# Patient Record
Sex: FEMALE | Race: AMERICAN INDIAN OR ALASKA NATIVE | HISPANIC OR LATINO | Employment: FULL TIME | URBAN - METROPOLITAN AREA
[De-identification: names, ages, dates, MRNs, and addresses within clinical notes are randomized per-mention and may not be internally consistent; named-entity substitution may affect disease eponyms.]

---

## 2022-05-24 ENCOUNTER — HOSPITAL ENCOUNTER (EMERGENCY)
Facility: HOSPITAL | Age: 22
Discharge: HOME | End: 2022-05-24
Attending: EMERGENCY MEDICINE | Admitting: EMERGENCY MEDICINE

## 2022-05-24 ENCOUNTER — APPOINTMENT (OUTPATIENT)
Dept: RADIOLOGY | Facility: HOSPITAL | Age: 22
End: 2022-05-24
Attending: EMERGENCY MEDICINE

## 2022-05-24 VITALS
RESPIRATION RATE: 19 BRPM | HEART RATE: 92 BPM | WEIGHT: 293 LBS | HEIGHT: 64 IN | SYSTOLIC BLOOD PRESSURE: 135 MMHG | BODY MASS INDEX: 50.02 KG/M2 | TEMPERATURE: 97.8 F | DIASTOLIC BLOOD PRESSURE: 70 MMHG | OXYGEN SATURATION: 99 %

## 2022-05-24 DIAGNOSIS — S80.01XA CONTUSION OF RIGHT KNEE, INITIAL ENCOUNTER: ICD-10-CM

## 2022-05-24 PROCEDURE — 73562 X-RAY EXAM OF KNEE 3: CPT | Mod: RT

## 2022-05-24 PROCEDURE — 250N000013 HC RX MED GY IP 250 OP 250 PS 637: Performed by: EMERGENCY MEDICINE

## 2022-05-24 PROCEDURE — 99283 EMERGENCY DEPT VISIT LOW MDM: CPT

## 2022-05-24 RX ADMIN — IBUPROFEN 800 MG: 200 TABLET, FILM COATED ORAL at 18:22

## 2022-05-24 NOTE — DISCHARGE INSTRUCTIONS
You were seen in the Emergency Department today for right knee pain.  Your imaging studies showed no significant cause of your symptoms. You should take ibuprofen, 600mg, three times per day as needed for pain.  You can also use acetaminophen, 650 mg, up to four times per day as needed for pain.  You can use these medications together, there are no concerninginteractions.  Follow up with your primary care physician to ensure resolution of symptoms. Return if you have new or worsening symptoms.

## 2022-05-24 NOTE — ED NOTES
"    ED Provider In Triage Note  [facility]  Encounter Date: May 24, 2022    Chief Complaint   Patient presents with     Fall     Knee Pain         Use of Intrepreter: N/A    Brief HPI:   Shereen Aj is a 21 year old female presenting to the Emergency Department with a chief complaint of Fall.     Fell on the stairs and landed on right knee causing pain. This occurred last night at 11pm.    Denies any other injuries.      Brief Physical Exam:  /82 (BP Location: Left arm)   Pulse 114   Temp 97.8  F (36.6  C) (Temporal)   Resp 16   Ht 1.626 m (5' 4\")   Wt 133.4 kg (294 lb)   LMP 04/07/2022   SpO2 98%   BMI 50.46 kg/m    General: Non-toxic appearing  HEENT: Atraumatic  Resp: No respiratory distress  Abdomen: Non-peritoneal   Neuro: Alert, oriented, answers questions appropriately  Psych: Behavior appropriate  Musculoskeletal: +right knee tender anteriorly at patella and bilaterally. No posterior knee pain and no proximal thigh or distal tibfib tenderness. Able to ambulate normally without limp.      Plan Initiated in Triage:  Xrays, ibuprofen    PIT Dispo:   Return to lobby while awaiting workup and ED bed availability          Mary Jo Dsouza MD on 5/24/2022 at 4:52 PM        Patient was evaluated by the Physician in Triage due to a limitation of available rooms in the Emergency Department. A plan of care was discussed based on the information obtained on the initial evaluation and patient was counseled to return back to the Emergency Department lobby after this initial evalutaiton until results were obtained or a room became available in the Emergency Department. Patient was counseled not to leave prior to receiving the results of their workup.            Mary Jo Dsouza MD  05/24/22 0769    "

## 2022-05-24 NOTE — ED TRIAGE NOTES
Patient reports she fell last night down the stairs last night and injured her right knee. Denies any pain anywhere else. Took Tylenol for pain.      Triage Assessment     Row Name 05/24/22 1118       Respiratory WDL    Respiratory WDL WDL       Cardiac WDL    Cardiac WDL WDL

## 2022-05-24 NOTE — Clinical Note
Shereen Aj was seen and treated in our emergency department on 5/24/2022.  She may return to work on 05/26/2022.       If you have any questions or concerns, please don't hesitate to call.      Samra Rodriguez MD

## 2022-05-24 NOTE — ED PROVIDER NOTES
EMERGENCY DEPARTMENT ENCOUNTER      NAME: Shereen Aj  AGE: 21 year old female  YOB: 2000  MRN: [id number]  EVALUATION DATE & TIME: No admission date for patient encounter.    PCP: No primary care provider on file.    ED PROVIDER: Samra Rodriguez M.D.      Chief Complaint   Patient presents with     Fall     Knee Pain     FINAL IMPRESSION:  1. Contusion of right knee, initial encounter      ED COURSE & MEDICAL DECISION MAKING:    Pertinent Labs & Imaging studies reviewed. (See chart for details)  ED Course as of 05/24/22 2203   Tue May 24, 2022   1833 Patient is a 21-year-old female who comes in today for evaluation of right knee pain after she fell on it yesterday.  She is able to ambulate but has pain with it.  She does not feel like it is unstable.  She just feels like it is painful.  She has pain over the patella and over the quad tendon and patellar tendon.  She has no significant joint line tenderness.  She otherwise looks okay.  X-ray came back unremarkable.  I discussed the results with her.  We discussed ice and elevation and need for Tylenol and ibuprofen as needed for discomfort.  She is in agreement with the plan.  She did need a note for work to take it easy for couple days so I wrote that for her.  She was comfortable with the plan for discharge home and was able to ambulate back to the waiting room to wait for her paperwork.       6:25 PM Due to a shortage of available emergency department rooms, with the patient's permission I met with the patient for the initial interview and physical examination in a triage room. Discussed plan for treatment and workup in the ED.  PPE: Provider wore gloves, N95 mask, eye protection, surgical cap, and paper mask.     6:31 PM I discussed the plan for discharge with the patient, and patient is agreeable. We discussed supportive cares at home and reasons for return to the ER including new or worsening symptoms - all questions and concerns  addressed. Patient to be discharged by RN.     At the conclusion of the encounter I discussed  the results of all of the tests and the disposition with patient.   All questions were answered.  The patient acknowledged understanding and was involved in the decision making regarding the overall care plan.      I discussed with patient the utility, limitations and findings of the exam/interventions/studies done during this visit as well as the list of differential diagnosis and symptoms to monitor/return to ER for.  Additional verbal discharge instructions were provided.     MEDICATIONS GIVEN IN THE EMERGENCY:  Medications   ibuprofen (ADVIL/MOTRIN) tablet 800 mg (800 mg Oral Given 5/24/22 8602)       NEW PRESCRIPTIONS STARTED AT TODAY'S ER VISIT  There are no discharge medications for this patient.         =================================================================    HPI    Triage Note:   Patient reports she fell last night down the stairs last night and injured her right knee. Denies any pain anywhere else. Took Tylenol for pain.      Triage Assessment     Row Name 05/24/22 8092       Respiratory WDL    Respiratory WDL WDL       Cardiac WDL    Cardiac WDL WDL                  Patient information was obtained from: The patient    Use of : N/A       Shereen Aj is a 21 year old female with no recorded pertient medical history who presents to the ED via walk-in for evaluation of a fall and associated knee pain.    The patient reports that yesterday (5/23) she fell down some stairs and landed on her right knee. She did not hit her head or lose consciousness. Since the incident she has had ongoing right knee pain. Her pain is provoked with extension of the leg. She is able to ambulate. She has been using Tylenol to manage her symptoms. Her knee does not feel unstable. The patient denies any other injuries.     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       REVIEW OF SYSTEMS  "  Except as stated in the HPI all other systems reviewed and are negative.    PAST MEDICAL HISTORY:  History reviewed. No pertinent past medical history.    PAST SURGICAL HISTORY:  History reviewed. No pertinent surgical history.    CURRENT MEDICATIONS:    No current facility-administered medications for this encounter.  No current outpatient medications on file.    ALLERGIES:  No Known Allergies    FAMILY HISTORY:  History reviewed. No pertinent family history.    SOCIAL HISTORY:        PHYSICAL EXAM    VITAL SIGNS: /70   Pulse 92   Temp 97.8  F (36.6  C) (Temporal)   Resp 19   Ht 1.626 m (5' 4\")   Wt 133.4 kg (294 lb)   LMP 04/07/2022   SpO2 99%   BMI 50.46 kg/m     GENERAL: Awake, Alert, answering questions, No acute distress, Well nourished  HEENT: Normal cephalic, Atraumatic, bilateral external ears normal, No scleral icterus, mask in place  NECK: No obvious swelling or abnormality, No stridor  PULMONARY: No respiratory distress  CARDIOVASCULAR: Regular rate and rhythm, Distal pulses present and normal.  EXTREMITIES: Tenderness over right patella, patella tendon, and quad tendon. As well as some tenderness behind the right knee. No significant joint line tenderness. Able to ambulate and is steady.  NEURO: No facial droop, normal motor function, Normal speech   PSYCH: Normal mood and affect  SKIN: No rashes on visualized skin, dry, warm     RADIOLOGY:  XR Knee Right 3 Views   Final Result   IMPRESSION: Normal joint spaces and alignment. No fracture or joint effusion.          I, Aravind Dozier, am serving as a scribe to document services personally performed by Dr. Rodriguez based on my observation and the provider's statements to me. I, Samra Rodriguez MD attest that Aravind Dozier is acting in a scribe capacity, has observed my performance of the services and has documented them in accordance with my direction.    Samra Rodriguez M.D.  Emergency Medicine  [facility]" [facility] EMERGENCY DEPARTMENT  [address] [place] [id number]  [phone]  Dept: [phone]     Samra Rodriguez MD  05/24/22 2249